# Patient Record
(demographics unavailable — no encounter records)

---

## 2024-10-30 NOTE — PHYSICAL EXAM
[General Appearance - Well Developed] : well developed [Normal Appearance] : normal appearance [Well Groomed] : well groomed [General Appearance - Well Nourished] : well nourished [No Deformities] : no deformities [General Appearance - In No Acute Distress] : no acute distress [Normal Conjunctiva] : the conjunctiva exhibited no abnormalities [Eyelids - No Xanthelasma] : the eyelids demonstrated no xanthelasmas [Normal Oral Mucosa] : normal oral mucosa [No Oral Pallor] : no oral pallor [No Oral Cyanosis] : no oral cyanosis [Normal Jugular Venous A Waves Present] : normal jugular venous A waves present [Normal Jugular Venous V Waves Present] : normal jugular venous V waves present [No Jugular Venous Vazquez A Waves] : no jugular venous vazquez A waves [Heart Rate And Rhythm] : heart rate and rhythm were normal [Heart Sounds] : normal S1 and S2 [Arterial Pulses Normal] : the arterial pulses were normal [Edema] : no peripheral edema present [Respiration, Rhythm And Depth] : normal respiratory rhythm and effort [Exaggerated Use Of Accessory Muscles For Inspiration] : no accessory muscle use [Auscultation Breath Sounds / Voice Sounds] : lungs were clear to auscultation bilaterally [Abdomen Soft] : soft [Abdomen Tenderness] : non-tender [Abdomen Mass (___ Cm)] : no abdominal mass palpated [Abnormal Walk] : normal gait [Gait - Sufficient For Exercise Testing] : the gait was sufficient for exercise testing [Nail Clubbing] : no clubbing of the fingernails [Cyanosis, Localized] : no localized cyanosis [Petechial Hemorrhages (___cm)] : no petechial hemorrhages [] : no ischemic changes [Oriented To Time, Place, And Person] : oriented to person, place, and time [Affect] : the affect was normal [No Anxiety] : not feeling anxious [Mood] : the mood was normal [FreeTextEntry1] : 2/6 TAD diffusely

## 2024-10-30 NOTE — HISTORY OF PRESENT ILLNESS
[FreeTextEntry1] : 10/29/24 - Pt reports occasional lower CP for 1 year, described as tenderness, radiated to right chest, not related to activities, lasting for a few minutes, relieved with massage. Pt also reports occasional dizziness, non-spinning nature, not related to activities, lasting for a few seconds. Pt reports SOB if she has flu or allergy. Pt denies palpitations. Pt denies h/o syncope.  Pt is on Atorvastatin 10 mg for HLD. She is on Amlodipine 5 mg for HTN. Her home BP ranged 110-120/60-70. Today's /82 P 66.  Exam showed 2/6 TAD diffusely.  ECG showed no ischemic changes.  I advised patient to undergo a treadmill stress test.  Patient declined due to knee issues.  I advised patient to undergo a CTA of coronary arteries.  I will obtain insurance authorization.    5/24/23 - Pt reports palpitations lasting seconds since > a month ago. Pt denies CP. Pt is on Amlodipine 5 mg and Lipitor 10 mg.  10/27/22 - Patient reports CP with palpitations.  She is on Atorvastatin 10 mg for HLD.  She is on Amlodipine 5 mg for HTN.  She is concerned about CAD.  Patient underwent an echocardiogram and it showed normal LV function without significant valvular pathology. Patient underwent a treadmill stress test and completed 5 minutes of Joe protocol.  There were upsloping ST depressions on ECG but no symptoms.  Following treadmill stress, there was no echocardiographic evidence of ischemia.   1/31/17 - Patient reports no cardiac history.  She recently experienced dizziness, described as spinning sensation, which she attributes to a tooth infection.  She wore a Holter with PCP and it showed PVC's and a PVC couplet.  Patient denies chest pain. Patient denies SOB. Patient denies palpitations. Patient denies history of syncope.

## 2024-10-30 NOTE — REASON FOR VISIT
[FreeTextEntry1] : 68 year-old female with HTN presents for followup.    Patient was last seen on 5/24/23 - Pt reports palpitations lasting seconds since > a month ago. Pt denies CP. Pt is on Amlodipine 5 mg and Lipitor 10 mg.  She is on Atorvastatin 10 mg for HLD.  She is on Amlodipine 5 mg for HTN.   Patient underwent an echocardiogram 10/27/22 and it showed normal LV function without significant valvular pathology.   Patient underwent a treadmill stress test 10/27/22  and completed 5 minutes of Joe protocol.  There were upsloping ST depressions on ECG but no symptoms.  Following treadmill stress, there was no echocardiographic evidence of ischemia.